# Patient Record
Sex: FEMALE | Race: WHITE | NOT HISPANIC OR LATINO | Employment: PART TIME | URBAN - METROPOLITAN AREA
[De-identification: names, ages, dates, MRNs, and addresses within clinical notes are randomized per-mention and may not be internally consistent; named-entity substitution may affect disease eponyms.]

---

## 2017-08-08 ENCOUNTER — ALLSCRIPTS OFFICE VISIT (OUTPATIENT)
Dept: OTHER | Facility: OTHER | Age: 37
End: 2017-08-08

## 2017-08-08 DIAGNOSIS — L02.214 CUTANEOUS ABSCESS OF GROIN: ICD-10-CM

## 2018-01-13 VITALS
BODY MASS INDEX: 21.35 KG/M2 | TEMPERATURE: 98.9 F | HEIGHT: 67 IN | WEIGHT: 136 LBS | HEART RATE: 88 BPM | SYSTOLIC BLOOD PRESSURE: 114 MMHG | DIASTOLIC BLOOD PRESSURE: 68 MMHG | RESPIRATION RATE: 16 BRPM

## 2018-03-13 ENCOUNTER — OFFICE VISIT (OUTPATIENT)
Dept: FAMILY MEDICINE CLINIC | Facility: CLINIC | Age: 38
End: 2018-03-13

## 2018-03-13 VITALS
RESPIRATION RATE: 18 BRPM | SYSTOLIC BLOOD PRESSURE: 122 MMHG | HEART RATE: 84 BPM | DIASTOLIC BLOOD PRESSURE: 78 MMHG | HEIGHT: 67 IN

## 2018-03-13 DIAGNOSIS — R39.15 URGENCY OF URINATION: ICD-10-CM

## 2018-03-13 DIAGNOSIS — F32.9 REACTIVE DEPRESSION: Primary | ICD-10-CM

## 2018-03-13 DIAGNOSIS — F51.01 PRIMARY INSOMNIA: ICD-10-CM

## 2018-03-13 DIAGNOSIS — D50.9 IRON DEFICIENCY ANEMIA, UNSPECIFIED IRON DEFICIENCY ANEMIA TYPE: ICD-10-CM

## 2018-03-13 PROCEDURE — 99212 OFFICE O/P EST SF 10 MIN: CPT | Performed by: INTERNAL MEDICINE

## 2018-03-13 RX ORDER — ZOLPIDEM TARTRATE 5 MG/1
5 TABLET ORAL
Qty: 5 TABLET | Refills: 0 | Status: SHIPPED | OUTPATIENT
Start: 2018-03-13 | End: 2018-04-23

## 2018-03-13 NOTE — PROGRESS NOTES
ASSESSMENT and PLAN:  Bc Tejeda is a 30 y o  female with:   Problem List Items Addressed This Visit     Reactive depression - Primary    Relevant Medications    zolpidem (AMBIEN) 5 mg tablet    sertraline (ZOLOFT) 50 mg tablet    Other Relevant Orders    Comprehensive metabolic panel    TSH, 3rd generation with T4 reflex    Iron deficiency anemia    Relevant Orders    Iron    CBC and differential    Comprehensive metabolic panel    TSH, 3rd generation with T4 reflex    Primary insomnia    Relevant Medications    zolpidem (AMBIEN) 5 mg tablet    Urgency of urination    Relevant Orders    UA (URINE) with reflex to Microscopic          SUBJECTIVE:  Bc Tejeda is a 30 y o  female who presents today with a chief complaint of Cold Like Symptoms    Last night she awoke with sudden onset sweats, feeling warm; had trouble sleeping ; she took valerian root- fell asleep for an hour- had vivid dreams; She is crying and she is feeling more depressed; She is having more trouble sleeping; She is more depressed and has a hx of depression-s he feels she is getting more depressed  Her appetite is off;       Review of Systems   Constitutional: Positive for appetite change and chills  HENT: Negative  Eyes: Negative  Respiratory: Negative  Cardiovascular: Negative  Gastrointestinal: Negative  Endocrine: Negative  Genitourinary: Positive for dysuria  Musculoskeletal: Positive for myalgias  Allergic/Immunologic: Negative  Neurological: Negative  Psychiatric/Behavioral: Positive for dysphoric mood (depressed mood;  not sleeping well ) and sleep disturbance  Negative for suicidal ideas  I have reviewed the patient's PMH, Social History, Medication List and Allergies        OBJECTIVE:  /78 (BP Location: Left arm, Patient Position: Sitting, Cuff Size: Large)   Pulse 84   Resp 18   Ht 5' 7" (1 702 m)   LMP 02/27/2018 (Approximate)   Physical Exam   Constitutional: She is oriented to person, place, and time  She appears well-developed and well-nourished  Fatigued pale female  HENT:   Head: Normocephalic and atraumatic  Right Ear: External ear normal    Left Ear: External ear normal    Nose: Nose normal    Mouth/Throat: Oropharynx is clear and moist  No oropharyngeal exudate  Eyes: Conjunctivae and EOM are normal  Pupils are equal, round, and reactive to light  Left eye exhibits no discharge  Neck: Normal range of motion  Neck supple  No JVD present  No tracheal deviation present  Thyromegaly present  Cardiovascular: Normal rate, regular rhythm, normal heart sounds and intact distal pulses  Exam reveals no gallop and no friction rub  No murmur heard  Pulmonary/Chest: Effort normal and breath sounds normal  No stridor  No respiratory distress  She has no wheezes  She has no rales  Abdominal: Soft  Bowel sounds are normal  She exhibits no distension  There is no tenderness  There is no rebound and no guarding  Musculoskeletal: Normal range of motion  She exhibits no edema or tenderness  Lymphadenopathy:     She has no cervical adenopathy  Neurological: She is alert and oriented to person, place, and time  She displays normal reflexes  No cranial nerve deficit  She exhibits normal muscle tone  Coordination normal    Skin: Skin is warm and dry  No rash noted  No erythema  No pallor  Psychiatric: Her behavior is normal  Judgment and thought content normal    dDepressed affect    Nursing note and vitals reviewed

## 2018-03-13 NOTE — PATIENT INSTRUCTIONS
No work today and tomorrow  Check labs  Call with update if no better  Start sertraline today  ambien at night for sleep=- no alcohol with this  Follow up in 4-5 weeks

## 2018-03-14 ENCOUNTER — APPOINTMENT (OUTPATIENT)
Dept: LAB | Facility: CLINIC | Age: 38
End: 2018-03-14
Payer: COMMERCIAL

## 2018-03-14 DIAGNOSIS — F32.9 REACTIVE DEPRESSION: ICD-10-CM

## 2018-03-14 DIAGNOSIS — D50.9 IRON DEFICIENCY ANEMIA, UNSPECIFIED IRON DEFICIENCY ANEMIA TYPE: ICD-10-CM

## 2018-03-14 LAB
ALBUMIN SERPL BCP-MCNC: 3.8 G/DL (ref 3.5–5)
ALP SERPL-CCNC: 48 U/L (ref 46–116)
ALT SERPL W P-5'-P-CCNC: 19 U/L (ref 12–78)
ANION GAP SERPL CALCULATED.3IONS-SCNC: 9 MMOL/L (ref 4–13)
AST SERPL W P-5'-P-CCNC: 11 U/L (ref 5–45)
BACTERIA UR QL AUTO: ABNORMAL /HPF
BASOPHILS # BLD AUTO: 0.05 THOUSANDS/ΜL (ref 0–0.1)
BASOPHILS NFR BLD AUTO: 1 % (ref 0–1)
BILIRUB SERPL-MCNC: 0.4 MG/DL (ref 0.2–1)
BILIRUB UR QL STRIP: NEGATIVE
BUN SERPL-MCNC: 8 MG/DL (ref 5–25)
CALCIUM SERPL-MCNC: 8.3 MG/DL (ref 8.3–10.1)
CHLORIDE SERPL-SCNC: 105 MMOL/L (ref 100–108)
CLARITY UR: ABNORMAL
CO2 SERPL-SCNC: 26 MMOL/L (ref 21–32)
COLOR UR: YELLOW
CREAT SERPL-MCNC: 0.64 MG/DL (ref 0.6–1.3)
EOSINOPHIL # BLD AUTO: 0.19 THOUSAND/ΜL (ref 0–0.61)
EOSINOPHIL NFR BLD AUTO: 2 % (ref 0–6)
ERYTHROCYTE [DISTWIDTH] IN BLOOD BY AUTOMATED COUNT: 12 % (ref 11.6–15.1)
GFR SERPL CREATININE-BSD FRML MDRD: 116 ML/MIN/1.73SQ M
GLUCOSE SERPL-MCNC: 81 MG/DL (ref 65–140)
GLUCOSE UR STRIP-MCNC: NEGATIVE MG/DL
HCT VFR BLD AUTO: 37.7 % (ref 34.8–46.1)
HGB BLD-MCNC: 12.6 G/DL (ref 11.5–15.4)
HGB UR QL STRIP.AUTO: NEGATIVE
IRON SERPL-MCNC: 75 UG/DL (ref 50–170)
KETONES UR STRIP-MCNC: NEGATIVE MG/DL
LEUKOCYTE ESTERASE UR QL STRIP: ABNORMAL
LYMPHOCYTES # BLD AUTO: 1.02 THOUSANDS/ΜL (ref 0.6–4.47)
LYMPHOCYTES NFR BLD AUTO: 12 % (ref 14–44)
MCH RBC QN AUTO: 31.2 PG (ref 26.8–34.3)
MCHC RBC AUTO-ENTMCNC: 33.4 G/DL (ref 31.4–37.4)
MCV RBC AUTO: 93 FL (ref 82–98)
MONOCYTES # BLD AUTO: 0.65 THOUSAND/ΜL (ref 0.17–1.22)
MONOCYTES NFR BLD AUTO: 8 % (ref 4–12)
MUCOUS THREADS UR QL AUTO: ABNORMAL
NEUTROPHILS # BLD AUTO: 6.63 THOUSANDS/ΜL (ref 1.85–7.62)
NEUTS SEG NFR BLD AUTO: 77 % (ref 43–75)
NITRITE UR QL STRIP: NEGATIVE
NON-SQ EPI CELLS URNS QL MICRO: ABNORMAL /HPF
PH UR STRIP.AUTO: 6 [PH] (ref 4.5–8)
PLATELET # BLD AUTO: 210 THOUSANDS/UL (ref 149–390)
PMV BLD AUTO: 10.7 FL (ref 8.9–12.7)
POTASSIUM SERPL-SCNC: 4 MMOL/L (ref 3.5–5.3)
PROT SERPL-MCNC: 7 G/DL (ref 6.4–8.2)
PROT UR STRIP-MCNC: ABNORMAL MG/DL
RBC # BLD AUTO: 4.04 MILLION/UL (ref 3.81–5.12)
RBC #/AREA URNS AUTO: ABNORMAL /HPF
SODIUM SERPL-SCNC: 140 MMOL/L (ref 136–145)
SP GR UR STRIP.AUTO: 1.02 (ref 1–1.03)
TSH SERPL DL<=0.05 MIU/L-ACNC: 1.4 UIU/ML (ref 0.36–3.74)
UROBILINOGEN UR QL STRIP.AUTO: 1 E.U./DL
WBC # BLD AUTO: 8.54 THOUSAND/UL (ref 4.31–10.16)
WBC #/AREA URNS AUTO: ABNORMAL /HPF

## 2018-03-14 PROCEDURE — 84443 ASSAY THYROID STIM HORMONE: CPT

## 2018-03-14 PROCEDURE — 85025 COMPLETE CBC W/AUTO DIFF WBC: CPT

## 2018-03-14 PROCEDURE — 81001 URINALYSIS AUTO W/SCOPE: CPT | Performed by: INTERNAL MEDICINE

## 2018-03-14 PROCEDURE — 83540 ASSAY OF IRON: CPT

## 2018-03-14 PROCEDURE — 36415 COLL VENOUS BLD VENIPUNCTURE: CPT

## 2018-03-14 PROCEDURE — 80053 COMPREHEN METABOLIC PANEL: CPT

## 2018-03-15 ENCOUNTER — TELEPHONE (OUTPATIENT)
Dept: FAMILY MEDICINE CLINIC | Facility: CLINIC | Age: 38
End: 2018-03-15

## 2018-03-15 NOTE — TELEPHONE ENCOUNTER
LEFT DETAILED MESSAGE ON PATIENT IDENTIFIED VOICEMAIL          ----- Message from Kita Mehta DO sent at 3/15/2018 10:34 AM EDT -----  Please call the patient regarding her normal result  L;abs are all very good thyroid is normal; blood count is normal-  , iron is normal    Kidney and liver are normal    Great news

## 2018-04-23 ENCOUNTER — OFFICE VISIT (OUTPATIENT)
Dept: FAMILY MEDICINE CLINIC | Facility: CLINIC | Age: 38
End: 2018-04-23

## 2018-04-23 VITALS
HEIGHT: 67 IN | RESPIRATION RATE: 16 BRPM | BODY MASS INDEX: 20.56 KG/M2 | SYSTOLIC BLOOD PRESSURE: 116 MMHG | WEIGHT: 131 LBS | DIASTOLIC BLOOD PRESSURE: 72 MMHG | TEMPERATURE: 99.4 F | HEART RATE: 68 BPM

## 2018-04-23 DIAGNOSIS — F51.01 PRIMARY INSOMNIA: ICD-10-CM

## 2018-04-23 DIAGNOSIS — F32.9 REACTIVE DEPRESSION: Primary | ICD-10-CM

## 2018-04-23 PROCEDURE — 99212 OFFICE O/P EST SF 10 MIN: CPT | Performed by: INTERNAL MEDICINE

## 2018-04-23 RX ORDER — ZOLPIDEM TARTRATE 10 MG/1
10 TABLET ORAL
Qty: 30 TABLET | Refills: 0 | Status: SHIPPED | OUTPATIENT
Start: 2018-04-23 | End: 2018-05-17 | Stop reason: SDUPTHER

## 2018-04-23 NOTE — PROGRESS NOTES
ASSESSMENT and PLAN:  Dasha Sheehan is a 30 y o  female with:   Problem List Items Addressed This Visit     Reactive depression - Primary    Relevant Medications    zolpidem (AMBIEN) 10 mg tablet    Primary insomnia    Relevant Medications    zolpidem (AMBIEN) 10 mg tablet          SUBJECTIVE:  Dasha Sheehan is a 30 y o  female who presents today with a chief complaint of Follow-up (Depression)    Patient here for follow up  She is better during the day- ambien 5 mg did not help - the 10 mg helped more  She had no problems waking up-  She did much better when she slept well  She tried otc sleep aids- did not work for her  Review of Systems   Constitutional: Positive for fatigue  Negative for fever and unexpected weight change  HENT: Negative  Eyes: Negative  Respiratory: Negative  Cardiovascular: Negative  Gastrointestinal: Negative  Endocrine: Negative  Genitourinary: Negative  Musculoskeletal: Negative  Allergic/Immunologic: Negative  Neurological: Negative  Hematological: Negative  Psychiatric/Behavioral: Positive for sleep disturbance (not sleeping well)  Negative for agitation, behavioral problems, confusion, decreased concentration, dysphoric mood, hallucinations and suicidal ideas  The patient is nervous/anxious  I have reviewed the patient's PMH, Social History, Medication List and Allergies  OBJECTIVE:  /72   Pulse 68   Temp 99 4 °F (37 4 °C)   Resp 16   Ht 5' 7" (1 702 m)   Wt 59 4 kg (131 lb)   LMP 04/21/2018   Breastfeeding? No   BMI 20 52 kg/m²   Physical Exam   Constitutional: She is oriented to person, place, and time  She appears well-developed and well-nourished  Fatigued pale female  HENT:   Head: Normocephalic and atraumatic  Right Ear: External ear normal    Left Ear: External ear normal    Nose: Nose normal    Mouth/Throat: Oropharynx is clear and moist  No oropharyngeal exudate     Eyes: Conjunctivae and EOM are normal  Pupils are equal, round, and reactive to light  Left eye exhibits no discharge  Neck: Normal range of motion  Neck supple  No JVD present  No tracheal deviation present  Thyromegaly present  Cardiovascular: Normal rate, regular rhythm, normal heart sounds and intact distal pulses  Exam reveals no gallop and no friction rub  No murmur heard  Pulmonary/Chest: Effort normal and breath sounds normal  No stridor  No respiratory distress  She has no wheezes  She has no rales  Abdominal: Soft  Bowel sounds are normal  She exhibits no distension  There is no tenderness  There is no rebound and no guarding  Musculoskeletal: Normal range of motion  She exhibits no edema or tenderness  Lymphadenopathy:     She has no cervical adenopathy  Neurological: She is alert and oriented to person, place, and time  She displays normal reflexes  No cranial nerve deficit  She exhibits normal muscle tone  Coordination normal    Skin: Skin is warm and dry  No rash noted  No erythema  No pallor  Psychiatric: Her behavior is normal  Judgment and thought content normal    Less depressed; Not sleeping well; zoloft seems to be working well for her moood but at [place] she can't sleep   Nursing note and vitals reviewed

## 2018-04-23 NOTE — PATIENT INSTRUCTIONS
Continue on sertraline 50 mg in am  Increase sleeping pill(zolpidem) to 10 mg- new rx  Update me in 3 weeks prior to any refills and let me know if better- if so we will continue; if not we will change the sertraline

## 2018-05-16 ENCOUNTER — TELEPHONE (OUTPATIENT)
Dept: FAMILY MEDICINE CLINIC | Facility: CLINIC | Age: 38
End: 2018-05-16

## 2018-05-17 ENCOUNTER — TELEPHONE (OUTPATIENT)
Dept: FAMILY MEDICINE CLINIC | Facility: CLINIC | Age: 38
End: 2018-05-17

## 2018-05-17 DIAGNOSIS — F51.01 PRIMARY INSOMNIA: ICD-10-CM

## 2018-05-17 DIAGNOSIS — F32.9 REACTIVE DEPRESSION: ICD-10-CM

## 2018-05-17 RX ORDER — ZOLPIDEM TARTRATE 10 MG/1
10 TABLET ORAL
Qty: 30 TABLET | Refills: 0 | Status: SHIPPED | OUTPATIENT
Start: 2018-05-17 | End: 2018-06-19 | Stop reason: SDUPTHER

## 2018-05-21 NOTE — TELEPHONE ENCOUNTER
Please inform patient her Zolpidem RX is here for , we do not have a controlled substance agreement on file for patient   Please have her come in and sign

## 2018-06-18 ENCOUNTER — TELEPHONE (OUTPATIENT)
Dept: FAMILY MEDICINE CLINIC | Facility: CLINIC | Age: 38
End: 2018-06-18

## 2018-06-19 DIAGNOSIS — F51.01 PRIMARY INSOMNIA: ICD-10-CM

## 2018-06-19 RX ORDER — ZOLPIDEM TARTRATE 10 MG/1
10 TABLET ORAL
Qty: 30 TABLET | Refills: 0 | Status: SHIPPED | OUTPATIENT
Start: 2018-06-19 | End: 2018-08-15 | Stop reason: SDUPTHER

## 2018-08-15 DIAGNOSIS — F51.01 PRIMARY INSOMNIA: ICD-10-CM

## 2018-08-15 DIAGNOSIS — F32.9 REACTIVE DEPRESSION: ICD-10-CM

## 2018-08-15 RX ORDER — ZOLPIDEM TARTRATE 10 MG/1
10 TABLET ORAL
Qty: 30 TABLET | Refills: 0 | Status: SHIPPED | OUTPATIENT
Start: 2018-08-15 | End: 2018-09-25 | Stop reason: SDUPTHER

## 2018-09-25 DIAGNOSIS — F32.9 REACTIVE DEPRESSION: ICD-10-CM

## 2018-09-25 DIAGNOSIS — F51.01 PRIMARY INSOMNIA: ICD-10-CM

## 2018-09-26 RX ORDER — ZOLPIDEM TARTRATE 10 MG/1
10 TABLET ORAL
Qty: 30 TABLET | Refills: 0 | Status: SHIPPED | OUTPATIENT
Start: 2018-09-26 | End: 2018-10-29 | Stop reason: SDUPTHER

## 2018-10-29 DIAGNOSIS — F51.01 PRIMARY INSOMNIA: ICD-10-CM

## 2018-10-29 RX ORDER — ZOLPIDEM TARTRATE 10 MG/1
10 TABLET ORAL
Qty: 30 TABLET | Refills: 0 | Status: SHIPPED | OUTPATIENT
Start: 2018-10-29 | End: 2018-12-12 | Stop reason: SDUPTHER

## 2018-10-29 NOTE — TELEPHONE ENCOUNTER
Patient requesting refill of Ambien 10 mg 30 day supply  Patient will  script  [place] PDMP site accessed, last fill on 09/26/2018  Last office visit on 04/23/2018, next office visit on 12/12/18

## 2018-10-29 NOTE — TELEPHONE ENCOUNTER
Please call pt and let her know that this is not a medication I will Rx scheduled, it is meant for PRN use, which is not how she is using it  This is what I will discuss with her at her f/u, please make her aware

## 2018-12-12 ENCOUNTER — OFFICE VISIT (OUTPATIENT)
Dept: FAMILY MEDICINE CLINIC | Facility: CLINIC | Age: 38
End: 2018-12-12

## 2018-12-12 VITALS
RESPIRATION RATE: 18 BRPM | WEIGHT: 154 LBS | HEIGHT: 67 IN | TEMPERATURE: 100.4 F | HEART RATE: 64 BPM | BODY MASS INDEX: 24.17 KG/M2 | SYSTOLIC BLOOD PRESSURE: 102 MMHG | DIASTOLIC BLOOD PRESSURE: 72 MMHG

## 2018-12-12 DIAGNOSIS — F32.9 REACTIVE DEPRESSION: Primary | ICD-10-CM

## 2018-12-12 DIAGNOSIS — G43.109 MIGRAINE WITH AURA AND WITHOUT STATUS MIGRAINOSUS, NOT INTRACTABLE: ICD-10-CM

## 2018-12-12 DIAGNOSIS — F51.01 PRIMARY INSOMNIA: ICD-10-CM

## 2018-12-12 PROBLEM — D50.9 IRON DEFICIENCY ANEMIA: Status: RESOLVED | Noted: 2018-03-13 | Resolved: 2018-12-12

## 2018-12-12 PROBLEM — R39.15 URGENCY OF URINATION: Status: RESOLVED | Noted: 2018-03-13 | Resolved: 2018-12-12

## 2018-12-12 PROCEDURE — 99213 OFFICE O/P EST LOW 20 MIN: CPT | Performed by: FAMILY MEDICINE

## 2018-12-12 RX ORDER — ZOLPIDEM TARTRATE 10 MG/1
10 TABLET ORAL
Qty: 30 TABLET | Refills: 0 | Status: SHIPPED | OUTPATIENT
Start: 2018-12-12 | End: 2019-01-11

## 2018-12-12 RX ORDER — AMITRIPTYLINE HYDROCHLORIDE 10 MG/1
10 TABLET, FILM COATED ORAL
Qty: 30 TABLET | Refills: 1 | Status: SHIPPED | OUTPATIENT
Start: 2018-12-12

## 2018-12-12 RX ORDER — SUMATRIPTAN 50 MG/1
50 TABLET, FILM COATED ORAL ONCE AS NEEDED
Qty: 9 TABLET | Refills: 0 | Status: SHIPPED | OUTPATIENT
Start: 2018-12-12

## 2018-12-12 RX ORDER — SERTRALINE HYDROCHLORIDE 100 MG/1
100 TABLET, FILM COATED ORAL DAILY
Qty: 30 TABLET | Refills: 1 | Status: SHIPPED | OUTPATIENT
Start: 2018-12-12

## 2018-12-12 NOTE — ASSESSMENT & PLAN NOTE
Patient has chronic issues with sleep, has tried over-the-counter sleep aids, melatonin, trazodone without much relief  Currently she is doing Ambien 10 milligram 2-3 times per week  Patient states that she has nightmares and anxiety at times because she is unable to sleep  Given her comorbid migraine-type headaches, will start amitriptyline low-dose at bedtime and try to limit use of Ambien  New controlled substance agreement signed today with the patient

## 2018-12-12 NOTE — ASSESSMENT & PLAN NOTE
Patient has persistent depression, still has hep a sewed of anhedonia and decreased motivation, no suicidal or homicidal ideation  Currently on sertraline 50 mg, it has been helping her but still has refractory symptoms, will increase to 100 mg  She has tried risperidone in the past with previous PCP

## 2018-12-12 NOTE — PATIENT INSTRUCTIONS
Increase Sertraline to 100mg, you can take 2 of the 50mg tablets  The Amitriptyline is the nightly medication to help with sleep and help prevent headaches  You can use the Sumatriptan as soon as you fell a headache coming on

## 2018-12-12 NOTE — PROGRESS NOTES
FAMILY MEDICINE PROGRESS NOTE  Abdon Woodard 41 y o  female   DATE: December 12, 2018     ASSESSMENT and PLAN:  Abdon Woodard is a 41 y o  female with:     Reactive depression  Patient has persistent depression, still has hep a sewed of anhedonia and decreased motivation, no suicidal or homicidal ideation  Currently on sertraline 50 mg, it has been helping her but still has refractory symptoms, will increase to 100 mg  She has tried risperidone in the past with previous PCP  Primary insomnia  Patient has chronic issues with sleep, has tried over-the-counter sleep aids, melatonin, trazodone without much relief  Currently she is doing Ambien 10 milligram 2-3 times per week  Patient states that she has nightmares and anxiety at times because she is unable to sleep  Given her comorbid migraine-type headaches, will start amitriptyline low-dose at bedtime and try to limit use of Ambien  New controlled substance agreement signed today with the patient  Migraine with aura and without status migrainosus, not intractable  Patient is describing migraine headache versus chronic daily headache  Patient states she has had it for months, but did not want to bring it up because she is worried about an extensive workup since she does not have medical insurance  Patient is denying any associated symptoms including visual deficits other than aura, no dizziness, nausea, vomiting  Will add amitriptyline low-dose at bedtime as prophylaxis, given sumatriptan p r n  At 1st sign of headache  Avoid daily analgesics  SUBJECTIVE:  Abdon Woodard is a 41 y o  female who presents today with a chief complaint of Depression (med refill) and Insomnia  Pt is here for medication refill and to establish with me as a new PCP  She has been taking Sertraline 50mg and it helps with her moods, calls it her "mood pill " She still feels a little bit nervous with the moods   She says it helps more with depression, but sometimes she has bad days and doesn't like to get out of the house  No SI HI  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  She feels that being around her clients is depressing  She takes Ambien PRN, she uses it every 2-3 days  On days that she works long days, she is able to fall asleep  She has problems with nightmares  She tried Melatonin and Trazodone in the past without relief  Per PDMP: Last fill was for Ambien 10mg qHS #30 on 9/26    She has also been having worsening headaches, almost daily headaches, has tried Tylenol without relief  Review of Systems   Constitutional: Positive for fatigue  Cardiovascular: Negative for chest pain  Neurological: Positive for headaches  Negative for dizziness and light-headedness  Psychiatric/Behavioral: Positive for dysphoric mood and sleep disturbance  Negative for suicidal ideas  The patient is nervous/anxious  I have reviewed the patient's PMH, Social History, Medication List and Allergies as appropriate  OBJECTIVE:  /72 (BP Location: Left arm, Patient Position: Sitting, Cuff Size: Standard)   Pulse 64   Temp 100 4 °F (38 °C)   Resp 18   Ht 5' 7" (1 702 m)   Wt 69 9 kg (154 lb)   LMP 11/17/2018   Breastfeeding? No   BMI 24 12 kg/m²    Physical Exam   Constitutional: She appears well-developed and well-nourished  No distress  Skin: She is not diaphoretic  Psychiatric: She has a normal mood and affect  Her speech is normal and behavior is normal  Judgment and thought content normal  Cognition and memory are normal  She expresses no homicidal and no suicidal ideation  Jerel Ibarra MD    Note: Portions of the record may have been created with voice recognition software  Occasional wrong word or "sound a like" substitutions may have occurred due to the inherent limitations of voice recognition software  Read the chart carefully and recognize, using context, where substitutions have occurred

## 2018-12-12 NOTE — ASSESSMENT & PLAN NOTE
Patient is describing migraine headache versus chronic daily headache  Patient states she has had it for months, but did not want to bring it up because she is worried about an extensive workup since she does not have medical insurance  Patient is denying any associated symptoms including visual deficits other than aura, no dizziness, nausea, vomiting  Will add amitriptyline low-dose at bedtime as prophylaxis, given sumatriptan p r n  At 1st sign of headache  Avoid daily analgesics

## 2023-10-16 ENCOUNTER — HOSPITAL ENCOUNTER (EMERGENCY)
Facility: HOSPITAL | Age: 43
Discharge: HOME | End: 2023-10-16
Attending: EMERGENCY MEDICINE
Payer: COMMERCIAL

## 2023-10-16 VITALS
TEMPERATURE: 99.3 F | OXYGEN SATURATION: 98 % | RESPIRATION RATE: 16 BRPM | SYSTOLIC BLOOD PRESSURE: 138 MMHG | DIASTOLIC BLOOD PRESSURE: 80 MMHG | HEART RATE: 83 BPM

## 2023-10-16 DIAGNOSIS — S01.81XA FACIAL LACERATION, INITIAL ENCOUNTER: ICD-10-CM

## 2023-10-16 DIAGNOSIS — S01.85XA DOG BITE OF FACE, INITIAL ENCOUNTER: Primary | ICD-10-CM
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
## 2023-10-16 PROCEDURE — 90715 TDAP VACCINE 7 YRS/> IM: CPT | Performed by: EMERGENCY MEDICINE

## 2023-10-16 RX ORDER — AMOXICILLIN AND CLAVULANATE POTASSIUM 875; 125 MG/1; MG/1
1 TABLET, FILM COATED ORAL EVERY 12 HOURS
Qty: 10 TABLET | Refills: 0 | Status: SHIPPED | OUTPATIENT
Start: 2023-10-16 | End: 2023-10-21

## 2023-10-16 RX ORDER — GINSENG 100 MG
1 CAPSULE ORAL ONCE
Status: COMPLETED | OUTPATIENT
Start: 2023-10-16 | End: 2023-10-16

## 2023-10-16 RX ORDER — LIDOCAINE 40 MG/G
CREAM TOPICAL ONCE
Status: COMPLETED | OUTPATIENT
Start: 2023-10-16 | End: 2023-10-16

## 2023-10-16 RX ORDER — LIDOCAINE HYDROCHLORIDE AND EPINEPHRINE 10; 10 MG/ML; UG/ML
20 INJECTION, SOLUTION INFILTRATION; PERINEURAL ONCE
Status: COMPLETED | OUTPATIENT
Start: 2023-10-16 | End: 2023-10-16

## 2023-10-16 RX ADMIN — LIDOCAINE 1 APPLICATION: 40 CREAM TOPICAL at 07:26

## 2023-10-16 RX ADMIN — LIDOCAINE HYDROCHLORIDE,EPINEPHRINE BITARTRATE 20 ML: 10; .01 INJECTION, SOLUTION INFILTRATION; PERINEURAL at 08:24

## 2023-10-16 RX ADMIN — BACITRACIN 1 SMALL APPLICATION: 500 OINTMENT TOPICAL at 08:59

## 2023-10-16 RX ADMIN — TETANUS TOXOID, REDUCED DIPHTHERIA TOXOID AND ACELLULAR PERTUSSIS VACCINE, ADSORBED 0.5 ML: 5; 2.5; 8; 8; 2.5 SUSPENSION INTRAMUSCULAR at 07:43

## 2023-10-16 NOTE — ED PROVIDER NOTES
History  Chief Complaint   Patient presents with    Dog Bite     Onset this morning within the last hr. Dog bite to face, upper lip. Unknown last tetanus. Dog UTD on immunizations. Domingo Armijo is a 41 y.o. female who presents for evaluation after she was bitten by her dog during play this morning. She has 2 lacerations and an abrasion to her face above her lip in the midline and to the right. She reports her dog is up to date with vaccinations and is acting appropriately. She does not know when her last tetanus shot was. No mucosal involvement although a portion of her wound does cross the [facility] border. Dog Bite      Prior to Admission Medications   Prescriptions Last Dose Informant Patient Reported? Taking? SUMAtriptan (IMITREX) 50 mg tablet   No No   Sig: Take 1 tablet (50 mg total) by mouth once as needed for migraine for up to 1 dose   amitriptyline (ELAVIL) 10 mg tablet   No No   Sig: Take 1 tablet (10 mg total) by mouth daily at bedtime   sertraline (ZOLOFT) 100 mg tablet   No No   Sig: Take 1 tablet (100 mg total) by mouth daily 1 tab daily in am   zolpidem (AMBIEN) 10 mg tablet   No No   Sig: Take 1 tablet (10 mg total) by mouth daily at bedtime as needed for sleep for up to 30 days [place] [facility] site Accessed, last fill on 09/26/2018. Facility-Administered Medications: None       History reviewed. No pertinent past medical history. History reviewed. No pertinent surgical history. History reviewed. No pertinent family history. I have reviewed and agree with the history as documented. E-Cigarette/Vaping     E-Cigarette/Vaping Substances     Social History     Tobacco Use    Smoking status: Every Day     Packs/day: 0.50     Types: Cigarettes    Smokeless tobacco: Never   Substance Use Topics    Alcohol use: Yes     Comment: Socially    Drug use: No       Review of Systems   Skin:  Positive for wound (facial lacerations). Hematological:  Does not bruise/bleed easily. Psychiatric/Behavioral:  The patient is nervous/anxious. Physical Exam  Physical Exam  Vitals and nursing note reviewed. Constitutional:       General: She is in acute distress. Appearance: She is well-developed. HENT:      Head: Normocephalic and atraumatic. Eyes:      Extraocular Movements: Extraocular movements intact. Pupils: Pupils are equal, round, and reactive to light. Neck:      Vascular: No JVD. Cardiovascular:      Rate and Rhythm: Normal rate and regular rhythm. Heart sounds: Normal heart sounds. No murmur heard. No friction rub. No gallop. Pulmonary:      Effort: Pulmonary effort is normal. No respiratory distress. Breath sounds: Normal breath sounds. No wheezing or rales. Chest:      Chest wall: No tenderness. Musculoskeletal:         General: No tenderness. Normal range of motion. Cervical back: Normal range of motion. Skin:     General: Skin is warm and dry. Comments: 1) 2.5 cm laceration to left face vertical above lip, with inferior tip crossing vermillion border 2) 1 cm laceration to face, above mid lip, vertical   Neurological:      General: No focal deficit present. Mental Status: She is alert and oriented to person, place, and time. Psychiatric:         Behavior: Behavior normal.         Thought Content:  Thought content normal.         Judgment: Judgment normal.         Vital Signs  ED Triage Vitals   Temperature Pulse Respirations Blood Pressure SpO2   10/16/23 0715 10/16/23 0713 10/16/23 0713 10/16/23 0713 10/16/23 0713   99.3 °F (37.4 °C) 83 16 138/80 98 %      Temp Source Heart Rate Source Patient Position - Orthostatic VS BP Location FiO2 (%)   10/16/23 0715 10/16/23 0713 10/16/23 0713 10/16/23 0713 --   Oral Monitor Sitting Right arm       Pain Score       --                  Vitals:    10/16/23 0713   BP: 138/80   Pulse: 83   Patient Position - Orthostatic VS: Sitting         Visual Acuity      ED Medications  Medications   lidocaine (LMX) 4 % cream (1 Application Topical Given 10/16/23 6666)   tetanus-diphtheria-acellular pertussis (BOOSTRIX) IM injection 0.5 mL (0.5 mL Intramuscular Given 10/16/23 0743)   lidocaine-epinephrine (XYLOCAINE/EPINEPHRINE) 1 %-1:100,000 injection 20 mL (20 mL Infiltration Given 10/16/23 0824)   bacitracin topical ointment 1 small application (1 small application Topical Given 10/16/23 0859)       Diagnostic Studies  Results Reviewed       None                   No orders to display              Procedures  Universal Protocol:  Consent: Verbal consent obtained. Consent given by: patient and spouse  Patient understanding: patient states understanding of the procedure being performed  Laceration repair    Date/Time: 10/16/2023 8:54 AM    Performed by: Scot Golden MD  Authorized by: Scot Golden MD  Associated wounds:   Wound 10/16/23 Traumatic Laceration Mouth Upper; Other (Comment)Body area: head/neck  Location details: left cheek  Laceration length: 2.5 cm  Tendon involvement: none  Nerve involvement: none  Vascular damage: no  Anesthesia: local infiltration (topical anesthetic as well)    Anesthesia:  Local Anesthetic: lidocaine 1% with epinephrine and LET (lido, epi, tetracaine)  Anesthetic total: 3 mL    Sedation:  Patient sedated: no        Procedure Details:  Irrigation solution: sterile water. Irrigation method: irrigation bottle, 18 guage holes, pressure. Amount of cleaning: extensive  Debridement: none  Degree of undermining: none  Skin closure: 5-0 nylon  Number of sutures: 7  Technique: simple  Approximation: close  Approximation difficulty: simple  Dressing: antibiotic ointment      Universal Protocol:  Consent: Verbal consent obtained.   Laceration repair    Date/Time: 10/16/2023 8:57 AM    Performed by: Scot Golden MD  Authorized by: Scot Golden MD  Body area: head/neck  Location details: upper lip  [place] border involved: no  Laceration length: 1 cm    Anesthesia:  Local Anesthetic: LET (lido, epi, tetracaine)      Procedure Details:  Irrigation solution: sterile water. Irrigation method: irrigation bottle, 18 guage holes, pressure. Amount of cleaning: extensive  Skin closure: glue  Approximation difficulty: simple               ED Course                                             Medical Decision Making  41 y.o. female with laceration to face will need suture repair  Tetanus is not up to date. Due to the location, will copiously irrigate wound prior to closure but for cosmesis will close. Will also prescribe abx as prophylaxis. Will update tetanus. Risk  OTC drugs. Prescription drug management. Disposition  Final diagnoses:   Dog bite of face, initial encounter   Facial laceration, initial encounter     Time reflects when diagnosis was documented in both MDM as applicable and the Disposition within this note       Time User Action Codes Description Comment    10/16/2023  8:59 AM Nguyễn Olivares Add [S01.85XA,  R22. 0XXA] Dog bite of face, initial encounter     10/16/2023  8:59 AM Nguyễn Olivares Add [S01.81XA] Facial laceration, initial encounter           ED Disposition       ED Disposition   Discharge    Condition   Stable    Date/Time   Mon Oct 16, 2023  8:59 AM    Comment   Diana Brandon discharge to home/self care.                    Follow-up Information       Follow up With Specialties Details Why Contact Info    Jaquelin Jimenez DO Internal Medicine Schedule an appointment as soon as possible for a visit in 1 week For suture removal [address] [place], [place] [id number]  [phone]              Patient's Medications   Discharge Prescriptions    AMOXICILLIN-CLAVULANATE (AUGMENTIN) 875-125 MG PER TABLET    Take 1 tablet by mouth every 12 (twelve) hours for 5 days       Start Date: 10/16/2023End Date: 10/21/2023       Order Dose: 1 tablet       Quantity: 10 tablet    Refills: 0       No discharge procedures on file.     PDMP Review       None            ED Provider  Electronically Signed by             Efra Damico MD  10/16/23 0980